# Patient Record
(demographics unavailable — no encounter records)

---

## 2024-12-19 NOTE — HISTORY OF PRESENT ILLNESS
[FreeTextEntry1] : 20 yo referred for proteinuria Has been healthy except GAYATHRI and other psychiatric issues Has never been told of protein on previous urine tests No skin rash, joint pains or other symptoms of note She is an intensive exerciser- treadmill jogging 4-5 x per week for 30-40 minutes No NSAIDS x around period Doesn't hydrate well In college at [employer]

## 2024-12-19 NOTE — ASSESSMENT
[FreeTextEntry1] : 20 yo referred for proteinuria Has been healthy except GAYATHRI and other psychiatric issues Has never been told of protein on previous urine tests No skin rash, joint pains or other symptoms of note She is an intensive exerciser- treadmill jogging 4-5 x per week for 30-40 minutes No NSAIDS x around period Doesn't hydrate well In college at [employer] ----- Proteinuria   2/27 and 3/1 had UA w dipstick + protein   UPC ~ 1.5 but low urine creatinine   6/2024 did 24 hour urine, good collection by creatinine, 339 mg protein   I think the urine protein is functional, just a lot of exercise and poor hydration   We spoke about the importance of hydrating better  The total time of preparation for this visit, the visit itself and writing the note was 36 minutes

## 2024-12-19 NOTE — PHYSICAL EXAM
[General Appearance - Alert] : alert [General Appearance - In No Acute Distress] : in no acute distress [Sclera] : the sclera and conjunctiva were normal [PERRL With Normal Accommodation] : pupils were equal in size, round, and reactive to light [Extraocular Movements] : extraocular movements were intact [Outer Ear] : the ears and nose were normal in appearance [Oropharynx] : the oropharynx was normal [Neck Appearance] : the appearance of the neck was normal [Neck Cervical Mass (___cm)] : no neck mass was observed [Jugular Venous Distention Increased] : there was no jugular-venous distention [Thyroid Diffuse Enlargement] : the thyroid was not enlarged [Thyroid Nodule] : there were no palpable thyroid nodules [Auscultation Breath Sounds / Voice Sounds] : lungs were clear to auscultation bilaterally [Heart Rate And Rhythm] : heart rate was normal and rhythm regular [Heart Sounds] : normal S1 and S2 [Heart Sounds Gallop] : no gallops [Murmurs] : no murmurs [Heart Sounds Pericardial Friction Rub] : no pericardial rub [Full Pulse] : the pedal pulses are present [Edema] : there was no peripheral edema [Bowel Sounds] : normal bowel sounds [Abdomen Soft] : soft [Abdomen Tenderness] : non-tender [] : no hepato-splenomegaly [Abdomen Mass (___ Cm)] : no abdominal mass palpated [Cervical Lymph Nodes Enlarged Posterior Bilaterally] : posterior cervical [Cervical Lymph Nodes Enlarged Anterior Bilaterally] : anterior cervical [Supraclavicular Lymph Nodes Enlarged Bilaterally] : supraclavicular [Axillary Lymph Nodes Enlarged Bilaterally] : axillary [Femoral Lymph Nodes Enlarged Bilaterally] : femoral [Inguinal Lymph Nodes Enlarged Bilaterally] : inguinal [No CVA Tenderness] : no ~M costovertebral angle tenderness [No Spinal Tenderness] : no spinal tenderness [Abnormal Walk] : normal gait [Nail Clubbing] : no clubbing  or cyanosis of the fingernails [Musculoskeletal - Swelling] : no joint swelling seen [Motor Tone] : muscle strength and tone were normal